# Patient Record
Sex: MALE | URBAN - METROPOLITAN AREA
[De-identification: names, ages, dates, MRNs, and addresses within clinical notes are randomized per-mention and may not be internally consistent; named-entity substitution may affect disease eponyms.]

---

## 2021-02-22 ENCOUNTER — HOSPITAL ENCOUNTER (EMERGENCY)
Facility: CLINIC | Age: 76
Discharge: LEFT BEFORE DISCHARGE | End: 2021-02-22
Admitting: EMERGENCY MEDICINE

## 2021-02-22 VITALS
RESPIRATION RATE: 18 BRPM | HEART RATE: 72 BPM | OXYGEN SATURATION: 100 % | TEMPERATURE: 98 F | SYSTOLIC BLOOD PRESSURE: 189 MMHG | DIASTOLIC BLOOD PRESSURE: 95 MMHG

## 2021-02-22 PROCEDURE — 999N000104 HC STATISTIC NO CHARGE

## 2021-02-22 PROCEDURE — 93005 ELECTROCARDIOGRAM TRACING: CPT

## 2021-02-22 RX ORDER — CLOPIDOGREL BISULFATE 75 MG/1
75 TABLET ORAL
COMMUNITY
Start: 2021-02-15

## 2021-02-23 LAB — INTERPRETATION ECG - MUSE: NORMAL

## 2021-02-23 NOTE — ED TRIAGE NOTES
Patient stated that he still have shoulder soreness but with improve chest pain. Patient stated that he wants to go home and has an appointment to see his primary care provider in the afternoon. Patient was advised of the risk for leaving without being evaluated by the MD. Patient verbalize understanding and still wants to go home. Verbal return precautions provided and patient advised to see his primary care provided as scheduled. Patient signed out declination of medicals screening examination.

## 2021-02-23 NOTE — ED TRIAGE NOTES
Here for concern epigastric/midsternal chest tightness started about 2 hours ago, associated with bilateral upper back/shoulder pain and mild sob. Took ibuprofen and aspirin 325mg prior to arrival. History of stents and is currently taking plavix. ABCs intact.